# Patient Record
Sex: FEMALE | Race: WHITE | NOT HISPANIC OR LATINO | URBAN - METROPOLITAN AREA
[De-identification: names, ages, dates, MRNs, and addresses within clinical notes are randomized per-mention and may not be internally consistent; named-entity substitution may affect disease eponyms.]

---

## 2023-08-30 ENCOUNTER — HOSPITAL ENCOUNTER (OUTPATIENT)
Dept: DATA CONVERSION | Facility: HOSPITAL | Age: 78
End: 2023-08-30
Attending: INTERNAL MEDICINE | Admitting: INTERNAL MEDICINE

## 2023-08-30 DIAGNOSIS — I25.2 OLD MYOCARDIAL INFARCTION: ICD-10-CM

## 2023-08-30 DIAGNOSIS — I10 ESSENTIAL (PRIMARY) HYPERTENSION: ICD-10-CM

## 2023-08-30 DIAGNOSIS — E78.5 HYPERLIPIDEMIA, UNSPECIFIED: ICD-10-CM

## 2023-08-30 DIAGNOSIS — I20.89 OTHER FORMS OF ANGINA PECTORIS (CMS-HCC): ICD-10-CM

## 2023-08-30 DIAGNOSIS — I21.4 NON-ST ELEVATION (NSTEMI) MYOCARDIAL INFARCTION (MULTI): ICD-10-CM

## 2023-08-30 DIAGNOSIS — I25.118 ATHEROSCLEROTIC HEART DISEASE OF NATIVE CORONARY ARTERY WITH OTHER FORMS OF ANGINA PECTORIS (CMS-HCC): ICD-10-CM

## 2023-08-30 DIAGNOSIS — I25.10 ATHEROSCLEROTIC HEART DISEASE OF NATIVE CORONARY ARTERY WITHOUT ANGINA PECTORIS: ICD-10-CM

## 2023-08-30 DIAGNOSIS — I48.91 UNSPECIFIED ATRIAL FIBRILLATION (MULTI): ICD-10-CM

## 2023-08-30 LAB
ANION GAP IN SER/PLAS: 13 MMOL/L (ref 10–20)
ATRIAL RATE: 54 BPM
ATRIAL RATE: 64 BPM
CALCIUM (MG/DL) IN SER/PLAS: 9.7 MG/DL (ref 8.6–10.3)
CARBON DIOXIDE, TOTAL (MMOL/L) IN SER/PLAS: 30 MMOL/L (ref 21–32)
CHLORIDE (MMOL/L) IN SER/PLAS: 101 MMOL/L (ref 98–107)
CREATININE (MG/DL) IN SER/PLAS: 0.73 MG/DL (ref 0.5–1.05)
ERYTHROCYTE DISTRIBUTION WIDTH (RATIO) BY AUTOMATED COUNT: 14.9 % (ref 11.5–14.5)
ERYTHROCYTE MEAN CORPUSCULAR HEMOGLOBIN CONCENTRATION (G/DL) BY AUTOMATED: 31.8 G/DL (ref 32–36)
ERYTHROCYTE MEAN CORPUSCULAR VOLUME (FL) BY AUTOMATED COUNT: 97 FL (ref 80–100)
ERYTHROCYTES (10*6/UL) IN BLOOD BY AUTOMATED COUNT: 4.2 X10E12/L (ref 4–5.2)
GFR FEMALE: 84 ML/MIN/1.73M2
GLUCOSE (MG/DL) IN SER/PLAS: 154 MG/DL (ref 74–99)
HEMATOCRIT (%) IN BLOOD BY AUTOMATED COUNT: 40.9 % (ref 36–46)
HEMOGLOBIN (G/DL) IN BLOOD: 13 G/DL (ref 12–16)
LEUKOCYTES (10*3/UL) IN BLOOD BY AUTOMATED COUNT: 15.1 X10E9/L (ref 4.4–11.3)
P AXIS: 53 DEGREES
P AXIS: 58 DEGREES
P OFFSET: 205 MS
P OFFSET: 212 MS
P ONSET: 155 MS
P ONSET: 171 MS
PLATELETS (10*3/UL) IN BLOOD AUTOMATED COUNT: 162 X10E9/L (ref 150–450)
POTASSIUM (MMOL/L) IN SER/PLAS: 3.9 MMOL/L (ref 3.5–5.3)
PR INTERVAL: 102 MS
PR INTERVAL: 132 MS
Q ONSET: 221 MS
Q ONSET: 222 MS
QRS COUNT: 10 BEATS
QRS COUNT: 9 BEATS
QRS DURATION: 88 MS
QRS DURATION: 90 MS
QT INTERVAL: 394 MS
QT INTERVAL: 416 MS
QTC CALCULATION(BAZETT): 394 MS
QTC CALCULATION(BAZETT): 406 MS
QTC FREDERICIA: 401 MS
QTC FREDERICIA: 402 MS
R AXIS: -1 DEGREES
R AXIS: 14 DEGREES
SODIUM (MMOL/L) IN SER/PLAS: 140 MMOL/L (ref 136–145)
T AXIS: 22 DEGREES
T AXIS: 47 DEGREES
T OFFSET: 419 MS
T OFFSET: 429 MS
UREA NITROGEN (MG/DL) IN SER/PLAS: 20 MG/DL (ref 6–23)
VENTRICULAR RATE: 54 BPM
VENTRICULAR RATE: 64 BPM

## 2023-09-18 LAB — POC ACTIVATED CLOTTING TIME LOW RANGE: 279 SECONDS (ref 89–169)

## 2023-09-29 VITALS — WEIGHT: 220.68 LBS | HEIGHT: 63 IN | BODY MASS INDEX: 39.1 KG/M2

## 2023-09-30 NOTE — H&P
History of Present Illness:   HPI:    CODI SIM is a 78 year old Female with known multivessel CAD, here for PCI of the RCA for level 3    Comorbidities:   Comorbidites:  ·  Comorbid Conditions congestive heart failure   ·  Type of Congestive Heart Failure diastolic   ·  CHF Additional Specificity with hypertension   ·  Acuity of CHF chronic     Family History:   Family History: reviewed and not pertinent to presenting  problem     Social History:   Social History:  Smoking Status former smoker   Alcohol Use denies              Allergies:  ·  sulfa drugs : Unknown    Medications Prior to Admission:   Admission Medication Reconciliation has not been completed for this patient.    Review of Systems:   Incomplete ROS: family not present to provide       Objective:   Physical Exam Narrative:  ·  Physical Exam:    NAD  RRR  CTA B  Soft NT/ND  No edema  Nonfocal    Recent Lab Results:    Results:    CBC: 8/30/2023 08:50              \     Hgb     /                              \     13.0       /  WBC  ----------------  Plt               15.1 H    ----------------    162              /     Hct     \                              /     40.9       \            RBC: 4.20     MCV: 97           BMP: 8/30/2023 08:50  NA+        Cl-     BUN  /                         140    101    20  /  --------------------------------  Glucose                ---------------------------  154 H    K+     HCO3-   Creat \                         3.9  30    0.73  \  Calcium : 9.7     Anion Gap : 13      Assessment and Plan:   Assessment:    PCI of the RCA, possible atherectomy    RBAP, pt agrees    ASA 3  Mallipati 2      Electronic Signatures:  Severiano Pagan)  (Signed 30-Aug-2023 11:23)   Authored: History of Present Illness, Comorbidities,  Family History, Social History, Allergies, Medications Prior to Admission, Review of Systems, Objective, Assessment and Plan, Note Completion      Last Updated: 30-Aug-2023 11:23 by  Severiano Pagna)

## 2025-06-29 LAB — NON-UH HIE GLUCOSE CAP: 199 MG/DL (ref 55–99)

## 2025-06-30 LAB
NON-UH HIE AGAP: 11 MEQ/L (ref 6–16)
NON-UH HIE BASOPHIL ABSOLUTE: 0.1 E9/L (ref 0–0.2)
NON-UH HIE BASOPHIL AUTO: 0.5 % (ref 0–2)
NON-UH HIE BUN/CREAT RATIO: 18 NO UNITS (ref 10–20)
NON-UH HIE BUN: 11 MG/DL (ref 5–21)
NON-UH HIE CALCIUM LVL: 9.1 MG/DL (ref 8.9–11.1)
NON-UH HIE CHLORIDE: 101 MMOL/L (ref 101–111)
NON-UH HIE CO2: 30 MMOL/L (ref 21–31)
NON-UH HIE CREATININE: 0.6 MG/DL (ref 0.5–1.3)
NON-UH HIE EGFR: 90 ML/MIN/1.73 M2
NON-UH HIE EOS ABSOLUTE: 0.1 E9/L (ref 0–0.5)
NON-UH HIE EOS AUTO: 0.5 % (ref 0–8)
NON-UH HIE GLUCOSE CAP: 189 MG/DL (ref 55–99)
NON-UH HIE GLUCOSE LVL: 216 MG/DL (ref 55–199)
NON-UH HIE HCT: 36.8 % (ref 34–46)
NON-UH HIE HGB: 12.4 GM/DL (ref 12–16)
NON-UH HIE LYMPH ABSOLUTE: 1.2 E9/L (ref 1–4)
NON-UH HIE LYMPH AUTO: 8.9 % (ref 14–50)
NON-UH HIE MCH: 32 PG (ref 27–34)
NON-UH HIE MCHC: 33.7 GM/DL (ref 31.4–36)
NON-UH HIE MCV: 95 FL (ref 80–100)
NON-UH HIE MONO ABSOLUTE: 0.9 E9/L (ref 0.2–1)
NON-UH HIE MONO AUTO: 7.2 % (ref 4–14)
NON-UH HIE MPV: 7.2 FL (ref 6.4–10.8)
NON-UH HIE NEUTRO ABSOLUTE: 10.7 E9/L (ref 2–7.5)
NON-UH HIE NEUTRO AUTO: 82.9 % (ref 36–75)
NON-UH HIE PLATELET: 166 E9/L (ref 150–500)
NON-UH HIE POTASSIUM LVL: 4.1 MMOL/L (ref 3.5–5.3)
NON-UH HIE PROCALCITONIN: 0.08 NG/ML (ref 0–0.5)
NON-UH HIE RBC: 3.9 E12/L (ref 4.3–5.9)
NON-UH HIE RDW: 15.4 % (ref 10.9–14.2)
NON-UH HIE SODIUM LVL: 138 MMOL/L (ref 135–145)
NON-UH HIE WBC: 13 E9/L (ref 4–11)